# Patient Record
Sex: FEMALE | Race: BLACK OR AFRICAN AMERICAN | Employment: OTHER | URBAN - METROPOLITAN AREA
[De-identification: names, ages, dates, MRNs, and addresses within clinical notes are randomized per-mention and may not be internally consistent; named-entity substitution may affect disease eponyms.]

---

## 2018-08-21 ENCOUNTER — HOSPITAL ENCOUNTER (EMERGENCY)
Age: 68
Discharge: NURSING, REHAB OR LONG-TERM CARE | End: 2018-08-21
Attending: EMERGENCY MEDICINE
Payer: MEDICARE

## 2018-08-21 ENCOUNTER — APPOINTMENT (OUTPATIENT)
Dept: GENERAL RADIOLOGY | Age: 68
End: 2018-08-21
Attending: EMERGENCY MEDICINE
Payer: MEDICARE

## 2018-08-21 ENCOUNTER — APPOINTMENT (OUTPATIENT)
Dept: CT IMAGING | Age: 68
End: 2018-08-21
Attending: EMERGENCY MEDICINE
Payer: MEDICARE

## 2018-08-21 VITALS
OXYGEN SATURATION: 93 % | BODY MASS INDEX: 29.64 KG/M2 | TEMPERATURE: 97.7 F | RESPIRATION RATE: 15 BRPM | HEART RATE: 81 BPM | WEIGHT: 157 LBS | DIASTOLIC BLOOD PRESSURE: 84 MMHG | SYSTOLIC BLOOD PRESSURE: 140 MMHG | HEIGHT: 61 IN

## 2018-08-21 DIAGNOSIS — S00.03XA CONTUSION OF SCALP, INITIAL ENCOUNTER: Primary | ICD-10-CM

## 2018-08-21 LAB
AMORPH CRY URNS QL MICRO: ABNORMAL
ANION GAP SERPL CALC-SCNC: 8 MMOL/L (ref 5–15)
APPEARANCE UR: ABNORMAL
BACTERIA URNS QL MICRO: NEGATIVE /HPF
BASOPHILS # BLD: 0.1 K/UL (ref 0–0.1)
BASOPHILS NFR BLD: 1 % (ref 0–1)
BILIRUB UR QL: NEGATIVE
BUN SERPL-MCNC: 10 MG/DL (ref 6–20)
BUN/CREAT SERPL: 9 (ref 12–20)
CALCIUM SERPL-MCNC: 9 MG/DL (ref 8.5–10.1)
CHLORIDE SERPL-SCNC: 98 MMOL/L (ref 97–108)
CO2 SERPL-SCNC: 28 MMOL/L (ref 21–32)
COLOR UR: ABNORMAL
CREAT SERPL-MCNC: 1.07 MG/DL (ref 0.55–1.02)
DIFFERENTIAL METHOD BLD: ABNORMAL
EOSINOPHIL # BLD: 0.2 K/UL (ref 0–0.4)
EOSINOPHIL NFR BLD: 2 % (ref 0–7)
EPITH CASTS URNS QL MICRO: ABNORMAL /LPF
ERYTHROCYTE [DISTWIDTH] IN BLOOD BY AUTOMATED COUNT: 15.8 % (ref 11.5–14.5)
GLUCOSE SERPL-MCNC: 87 MG/DL (ref 65–100)
GLUCOSE UR STRIP.AUTO-MCNC: NEGATIVE MG/DL
HCT VFR BLD AUTO: 31.7 % (ref 35–47)
HGB BLD-MCNC: 9.9 G/DL (ref 11.5–16)
HGB UR QL STRIP: ABNORMAL
IMM GRANULOCYTES # BLD: 0.1 K/UL (ref 0–0.04)
IMM GRANULOCYTES NFR BLD AUTO: 1 % (ref 0–0.5)
KETONES UR QL STRIP.AUTO: NEGATIVE MG/DL
LEUKOCYTE ESTERASE UR QL STRIP.AUTO: ABNORMAL
LYMPHOCYTES # BLD: 0.8 K/UL (ref 0.8–3.5)
LYMPHOCYTES NFR BLD: 8 % (ref 12–49)
MCH RBC QN AUTO: 29.3 PG (ref 26–34)
MCHC RBC AUTO-ENTMCNC: 31.2 G/DL (ref 30–36.5)
MCV RBC AUTO: 93.8 FL (ref 80–99)
MONOCYTES # BLD: 0.7 K/UL (ref 0–1)
MONOCYTES NFR BLD: 7 % (ref 5–13)
NEUTS SEG # BLD: 8 K/UL (ref 1.8–8)
NEUTS SEG NFR BLD: 81 % (ref 32–75)
NITRITE UR QL STRIP.AUTO: NEGATIVE
NRBC # BLD: 0 K/UL (ref 0–0.01)
NRBC BLD-RTO: 0 PER 100 WBC
PH UR STRIP: 6 [PH] (ref 5–8)
PLATELET # BLD AUTO: 538 K/UL (ref 150–400)
PMV BLD AUTO: 8.5 FL (ref 8.9–12.9)
POTASSIUM SERPL-SCNC: 3.6 MMOL/L (ref 3.5–5.1)
PROT UR STRIP-MCNC: >300 MG/DL
RBC # BLD AUTO: 3.38 M/UL (ref 3.8–5.2)
RBC #/AREA URNS HPF: >100 /HPF (ref 0–5)
SODIUM SERPL-SCNC: 134 MMOL/L (ref 136–145)
SP GR UR REFRACTOMETRY: 1.02 (ref 1–1.03)
UA: UC IF INDICATED,UAUC: ABNORMAL
UROBILINOGEN UR QL STRIP.AUTO: 0.2 EU/DL (ref 0.2–1)
WBC # BLD AUTO: 9.8 K/UL (ref 3.6–11)
WBC URNS QL MICRO: ABNORMAL /HPF (ref 0–4)

## 2018-08-21 PROCEDURE — 87086 URINE CULTURE/COLONY COUNT: CPT | Performed by: EMERGENCY MEDICINE

## 2018-08-21 PROCEDURE — 85025 COMPLETE CBC W/AUTO DIFF WBC: CPT | Performed by: EMERGENCY MEDICINE

## 2018-08-21 PROCEDURE — 73502 X-RAY EXAM HIP UNI 2-3 VIEWS: CPT

## 2018-08-21 PROCEDURE — 81001 URINALYSIS AUTO W/SCOPE: CPT | Performed by: EMERGENCY MEDICINE

## 2018-08-21 PROCEDURE — 72125 CT NECK SPINE W/O DYE: CPT

## 2018-08-21 PROCEDURE — 99284 EMERGENCY DEPT VISIT MOD MDM: CPT

## 2018-08-21 PROCEDURE — 36415 COLL VENOUS BLD VENIPUNCTURE: CPT | Performed by: EMERGENCY MEDICINE

## 2018-08-21 PROCEDURE — 80048 BASIC METABOLIC PNL TOTAL CA: CPT | Performed by: EMERGENCY MEDICINE

## 2018-08-21 PROCEDURE — 70450 CT HEAD/BRAIN W/O DYE: CPT

## 2018-08-21 PROCEDURE — 77030005538 HC CATH URETH FOL44 BARD -B

## 2018-08-21 PROCEDURE — 87077 CULTURE AEROBIC IDENTIFY: CPT | Performed by: EMERGENCY MEDICINE

## 2018-08-21 PROCEDURE — 87186 SC STD MICRODIL/AGAR DIL: CPT | Performed by: EMERGENCY MEDICINE

## 2018-08-21 NOTE — ED NOTES
Stage II pressure wound on sacrum and anterior rt upper thigh. This wound is mostly healed, but there are a few shallow open areas. Pt was found to have stool, which was cleaned. Indwelling wyatt catheter was noted, placed 08/10. This will be changed today and urine sample collected.

## 2018-08-21 NOTE — ED NOTES
Pt reports falling out of bed between 0100 and 0300 this morning at [facility]. Pt reports headache and has small hematoma to right side of head. Pt reports chronic left hip pain. Pt in no acute distress at this time. VSS. Call bell within reach.

## 2018-08-21 NOTE — ED NOTES
Bedside shift change report given to Virginia Sánchez [facility] (oncoming nurse) by Francisco Javier Tipton RN (offgoing nurse). Report included the following information SBAR, Kardex, ED Summary, Intake/Output, MAR and Recent Results.

## 2018-08-21 NOTE — PROGRESS NOTES
CM consulted re: questions regarding stay at [facility] and [place]. CM met with pt and pt's son, introduced self, role. Pt and son verbalized understanding. Pt's son stated he would like the pt transferred to [facility] for rehab. CM offered education re: inpatient rehab vs. SNF care. Pt's son verbalized understanding. CM offered that if pt would like to switch SNF facilities to speak with admissions at [facility] for insurance authorization. Pt's son verbalized understanding and stated he has no further questions or concerns at this time. CM will continue to remain available for support, discharge planning as needed. Care Management Interventions  PCP Verified by CM:  Yes  Mode of Transport at Discharge: BLS  Transition of Care Consult (CM Consult): Discharge Planning  Discharge Durable Medical Equipment: No  Physical Therapy Consult: No  Occupational Therapy Consult: No  Current Support Network: Own Home  Confirm Follow Up Transport: Family  Plan discussed with Pt/Family/Caregiver: Yes  Discharge Location  Discharge Placement: Skilled nursing facility    LANI Nash  [facility] Manager  [phone]

## 2018-08-21 NOTE — DISCHARGE INSTRUCTIONS

## 2018-08-21 NOTE — ED NOTES
MD has reviewed discharge instructions with the patient. The patient verbalized understanding. Transportation arranged for patient to be discharged via [facility] to [address]

## 2018-08-21 NOTE — ED PROVIDER NOTES
EMERGENCY DEPARTMENT HISTORY AND PHYSICAL EXAM          Date: 8/21/2018  Patient Name: Beverly Newsome    History of Presenting Illness     Chief Complaint   Patient presents with    Fall     GLF between 0100 and 0300; pt reports hitting head; small hematoma to right side of head       History Provided By: Patient, Patient's Son and EMS    HPI: Beverly Newsome is a 69 y.o. female, pmhx HTN / UTI / urosepsis, who presents via EMS as transferred from [facility] to the ED for evaluation s/p GLF. She states she was attempting to get up when she slipped out of bed hitting the R side of her head on a nearby bedside table. Son notes the pt was recently admitted to [facility] for urosepsis and was subsequently placed at [facility]. Son further states the pt was later found to have multiple sacral fractures currently being followed. He notes the pt was acting \"loopy\" prior to her recent admission. Son states \"we were told the UTI was gone, but she's kind of acting loopy again. \" He notes the pt was found to have a secondary yeast infection, for which she is currently being treated with Diflucan. Pt additionally noted to take Oxycodone and Seroquel PRN. Pt denies any recent medications for her current sxs. She otherwise specifically denies any recent dizziness, blurred vision,  fevers, chills, nausea, vomiting, diarrhea, abd pain, CP, SOB, urinary sxs, changes in BM, or headache. PCP: Tonya Ramirez MD    PMHx: Significant for seizures, HTN, UTI, urosepsis, yeast infection  Social Hx: -tobacco, -EtOH, -Illicit Drugs    There are no other complaints, changes, or physical findings at this time.      Hematoma to the R side of her scalp  No montgomery sign  No racoon eyes  No mid-line cervical tenderness to palpation or step-off  abd soft without tenderness to palpation, no bruising  Full active and passive ROM of BLE and BL hips      Past History     Past Medical History:  No past medical history on file.    Past Surgical History:  No past surgical history on file. Family History:  No family history on file. Social History:  Social History   Substance Use Topics    Smoking status: Not on file    Smokeless tobacco: Not on file    Alcohol use Not on file       Allergies: Allergies   Allergen Reactions    Penicillins Swelling         Review of Systems   Review of Systems   Constitutional: Negative for chills and fever. HENT: Negative for congestion and sore throat. Eyes: Negative for visual disturbance. Respiratory: Negative for cough and shortness of breath. Cardiovascular: Negative for chest pain and leg swelling. Gastrointestinal: Negative for abdominal pain, blood in stool, diarrhea and nausea. Endocrine: Negative for polyuria. Genitourinary: Negative for dysuria, flank pain, vaginal bleeding and vaginal discharge. Musculoskeletal: Negative for myalgias. Skin: Positive for wound. Negative for rash. Allergic/Immunologic: Negative for immunocompromised state. Neurological: Negative for weakness and headaches. No LOC   Psychiatric/Behavioral: Positive for confusion. Physical Exam   Physical Exam   Constitutional: She is oriented to person, place, and time. She appears well-developed and well-nourished. HENT:   Head: Normocephalic and atraumatic. Mouth/Throat: Oropharynx is clear and moist.   Eyes: Conjunctivae and EOM are normal. Pupils are equal, round, and reactive to light. Neck: Normal range of motion. Neck supple. No tracheal deviation present. Cardiovascular: Normal rate, regular rhythm, normal heart sounds and intact distal pulses. No murmur heard. Pulmonary/Chest: Effort normal and breath sounds normal. No respiratory distress. She has no wheezes. She has no rales. Abdominal: Soft. Bowel sounds are normal. There is no tenderness. There is no rebound and no guarding. Musculoskeletal: She exhibits no edema or deformity.    Neurological: She is alert and oriented to person, place, and time. GCS eye subscore is 4. GCS verbal subscore is 5. GCS motor subscore is 6. EOMI intact, no facial droop or asymmetry, normal/equal sensation in face. Uvula elevates at midline, no tongue deviation. Normal strength with head rotation and shoulder shrug. 5/5 Strength in the bilateral upper and lower extremities, no pronator drift, normal finger to nose. No truncal ataxia. Normal speech: no dysarthria or aphasia. Skin: Skin is warm and dry. Psychiatric: She has a normal mood and affect. Her speech is normal.   Nursing note and vitals reviewed.         Diagnostic Study Results     Labs -     Recent Results (from the past 12 hour(s))   METABOLIC PANEL, BASIC    Collection Time: 08/21/18  7:16 AM   Result Value Ref Range    Sodium 134 (L) 136 - 145 mmol/L    Potassium 3.6 3.5 - 5.1 mmol/L    Chloride 98 97 - 108 mmol/L    CO2 28 21 - 32 mmol/L    Anion gap 8 5 - 15 mmol/L    Glucose 87 65 - 100 mg/dL    BUN 10 6 - 20 MG/DL    Creatinine 1.07 (H) 0.55 - 1.02 MG/DL    BUN/Creatinine ratio 9 (L) 12 - 20      GFR est AA >60 >60 ml/min/1.73m2    GFR est non-AA 51 (L) >60 ml/min/1.73m2    Calcium 9.0 8.5 - 10.1 MG/DL   URINALYSIS W/ REFLEX CULTURE    Collection Time: 08/21/18  8:12 AM   Result Value Ref Range    Color DARK YELLOW      Appearance CLOUDY (A) CLEAR      Specific gravity 1.025 1.003 - 1.030      pH (UA) 6.0 5.0 - 8.0      Protein >300 (A) NEG mg/dL    Glucose NEGATIVE  NEG mg/dL    Ketone NEGATIVE  NEG mg/dL    Bilirubin NEGATIVE  NEG      Blood LARGE (A) NEG      Urobilinogen 0.2 0.2 - 1.0 EU/dL    Nitrites NEGATIVE  NEG      Leukocyte Esterase MODERATE (A) NEG      WBC 20-50 0 - 4 /hpf    RBC >100 (H) 0 - 5 /hpf    Epithelial cells FEW FEW /lpf    Bacteria NEGATIVE  NEG /hpf    UA:UC IF INDICATED URINE CULTURE ORDERED (A) CNI      Amorphous Crystals 2+ (A) NEG   CBC WITH AUTOMATED DIFF    Collection Time: 08/21/18  8:59 AM   Result Value Ref Range    WBC 9.8 3.6 - 11.0 K/uL    RBC 3.38 (L) 3.80 - 5.20 M/uL    HGB 9.9 (L) 11.5 - 16.0 g/dL    HCT 31.7 (L) 35.0 - 47.0 %    MCV 93.8 80.0 - 99.0 FL    MCH 29.3 26.0 - 34.0 PG    MCHC 31.2 30.0 - 36.5 g/dL    RDW 15.8 (H) 11.5 - 14.5 %    PLATELET 288 (H) 558 - 400 K/uL    MPV 8.5 (L) 8.9 - 12.9 FL    NRBC 0.0 0  WBC    ABSOLUTE NRBC 0.00 0.00 - 0.01 K/uL    NEUTROPHILS 81 (H) 32 - 75 %    LYMPHOCYTES 8 (L) 12 - 49 %    MONOCYTES 7 5 - 13 %    EOSINOPHILS 2 0 - 7 %    BASOPHILS 1 0 - 1 %    IMMATURE GRANULOCYTES 1 (H) 0.0 - 0.5 %    ABS. NEUTROPHILS 8.0 1.8 - 8.0 K/UL    ABS. LYMPHOCYTES 0.8 0.8 - 3.5 K/UL    ABS. MONOCYTES 0.7 0.0 - 1.0 K/UL    ABS. EOSINOPHILS 0.2 0.0 - 0.4 K/UL    ABS. BASOPHILS 0.1 0.0 - 0.1 K/UL    ABS. IMM. GRANS. 0.1 (H) 0.00 - 0.04 K/UL    DF AUTOMATED         Radiologic Studies -     XR HIP LT W OR WO PELV 2-3 VWS              CT Results  (Last 48 hours)               08/21/18 0649  CT HEAD WO CONT Final result    Impression:  IMPRESSION:    There is no acute intracranial abnormality. Narrative:  EXAM:  CT head without contrast       INDICATION: Fall with head injury. COMPARISON: None. TECHNIQUE: Axial noncontrast head CT from foramen magnum to vertex. Coronal and   sagittal reformatted images were obtained. CT dose reduction was achieved   through use of a standardized protocol tailored for this examination and   automatic exposure control for dose modulation. FINDINGS:  There is diffuse age-related parenchymal volume loss. The ventricles   and sulci are age-appropriate without hydrocephalus. There is no mass effect or   midline shift. There is no intracranial hemorrhage or extra-axial fluid   collection. There is no significant white matter disease. The gray-white matter   differentiation is maintained. The basal cisterns are patent. The osseous structures are intact. A left frontal sinus osteoma is noted.  The   visualized paranasal sinuses and mastoid air cells are otherwise clear. 08/21/18 0649  CT SPINE CERV WO CONT Final result    Impression:  IMPRESSION:    No acute abnormality. Cervical degenerative changes with grade 1 anterolisthesis   at C3-4 and C4-5. There is left neural foraminal stenosis at C3-4. Narrative:  EXAM:  CT C-spine without contrast       INDICATION: Fall with head injury. COMPARISON: None. TECHNIQUE: Thin section axial noncontrast CT of the cervical spine with coronal   and sagittal reformats. CT dose reduction was achieved through use of a   standardized protocol tailored for this examination and automatic exposure   control for dose modulation. FINDINGS: There is no acute fracture or subluxation. Vertebral body heights are   maintained. There is 2 to 3 mm of anterolisthesis at C3-4 and C4-5. There is   mild intervertebral disc space narrowing at C4-5 and C6-7. There is no spinal   canal stenosis. There is left neural foraminal stenosis at C3-4. The paraspinal   soft tissues are unremarkable. Medical Decision Making   I am the first provider for this patient. I reviewed the vital signs, available nursing notes, past medical history, past surgical history, family history and social history. Vital Signs-Reviewed the patient's vital signs.   Patient Vitals for the past 12 hrs:   Temp Pulse Resp BP SpO2   08/21/18 0913 - - - - 96 %   08/21/18 0800 - - - 151/73 -   08/21/18 0700 - - - - 97 %   08/21/18 0630 - - - - 99 %   08/21/18 0615 - - - 130/58 99 %   08/21/18 0600 - - - 125/71 -   08/21/18 0545 - - - 118/89 95 %   08/21/18 0536 98.4 °F (36.9 °C) 94 16 135/74 100 %       Pulse Oximetry Analysis - 100% on RA    Cardiac Monitor:   Rate: 92 bpm  Rhythm: Normal Sinus Rhythm     Records Reviewed: Nursing Notes, Old Medical Records and Previous electrocardiograms    Provider Notes (Medical Decision Making):     DDx: sprain, strain, fracture, contusion, recurrent UTI, electrolyte disturbance    ED Course:   Initial assessment performed. The patients presenting problems have been discussed, and they are in agreement with the care plan formulated and outlined with them. I have encouraged them to ask questions as they arise throughout their visit. SIGN OUT:  7:00 AM  Patient's presentation, labs/imaging and plan of care was reviewed with Rodney Esquivel MD as part of sign out. They will await pending lab and imaging studies as part of the plan discussed with the patient. Rodney Esquivel MD's assistance in completion of this plan is greatly appreciated but it should be noted that I will be the provider of record for this patient. [employer], DO    8:04 AM  Nursing reports helping pt undress in order to obtain urine. She reports pt covered in stool; pt cleaned. Nursing notes multiple stage II pressure ulcers across pt's sacrum and groin. She states pt is unsure why she has indwelling wyatt catheter. Diagnosis     Clinical Impression:   1. Contusion of scalp, initial encounter        PLAN:  1. There are no discharge medications for this patient. 2.   Follow-up Information     Follow up With Details Comments 1282 Ryanne Hernandez MD In 2 days As needed, If symptoms worsen [address]  [address] [facility]  [phone]          Return to ED if worse     Disposition:    DISCHARGE NOTE:  10:19 AM  The patient's results have been reviewed with family and/or caregiver. They verbally convey their understanding and agreement of the patient's signs, symptoms, diagnosis, treatment, and prognosis. They additionally agree to follow up as recommended in the discharge instructions or to return to the Emergency Room should the patient's condition change prior to their follow-up appointment. The family and/or caregiver verbally agrees with the care-plan and all of their questions have been answered.  The discharge instructions have also been provided to the them along with educational information regarding the patient's diagnosis and a list of reasons why the patient would want to return to the ER prior to their follow-up appointment should their condition change. Attestations: This note is prepared by Kassidy Patel, acting as DO Gasper Mejia DO  : The scribe's documentation has been prepared under my direction and personally reviewed by me in its entirety. I confirm that the note above accurately reflects all work, treatment, procedures, and medical decision making performed by me. This note will not be viewable in [address]

## 2018-08-23 LAB
BACTERIA SPEC CULT: ABNORMAL
CC UR VC: ABNORMAL
SERVICE CMNT-IMP: ABNORMAL

## 2018-08-23 NOTE — PROGRESS NOTES
[place] [address] where patient is currently located. Pt was discharged back to their facility after her recent ED visit without antibiotics. Faxed a copy of her urine culture to her nurse Gissel Sullivan for review by her attending so the patient may be treated.

## 2018-09-13 ENCOUNTER — HOSPITAL ENCOUNTER (OUTPATIENT)
Dept: MRI IMAGING | Age: 68
Discharge: HOME | End: 2018-09-13
Attending: ORTHOPAEDIC SURGERY
Payer: MEDICARE

## 2018-09-13 DIAGNOSIS — M47.816 OSTEOARTHRITIS OF LUMBAR SPINE: ICD-10-CM

## 2018-09-13 PROCEDURE — 72148 MRI LUMBAR SPINE W/O DYE: CPT

## 2018-09-25 ENCOUNTER — OFFICE VISIT (OUTPATIENT)
Dept: NEUROLOGY | Age: 68
End: 2018-09-25

## 2018-09-25 VITALS
HEART RATE: 84 BPM | HEIGHT: 61 IN | BODY MASS INDEX: 28.89 KG/M2 | RESPIRATION RATE: 16 BRPM | SYSTOLIC BLOOD PRESSURE: 130 MMHG | OXYGEN SATURATION: 95 % | WEIGHT: 153 LBS | DIASTOLIC BLOOD PRESSURE: 80 MMHG

## 2018-09-25 DIAGNOSIS — R27.0 ATAXIA: ICD-10-CM

## 2018-09-25 DIAGNOSIS — M48.062 SPINAL STENOSIS OF LUMBAR REGION WITH NEUROGENIC CLAUDICATION: Primary | ICD-10-CM

## 2018-09-25 DIAGNOSIS — R41.3 MEMORY LOSS: ICD-10-CM

## 2018-09-25 RX ORDER — LEVETIRACETAM 500 MG/1
TABLET ORAL DAILY
COMMUNITY

## 2018-09-25 NOTE — LETTER
9/25/2018 11:52 AM 
 
Patient:  Ade Casper YOB: 1950 Date of Visit: 9/25/2018 Dear Pola Sharma MD 
[address] [facility] [address] [place] [id number] VIA In Basket Isaías King MD 
[address] [place] [phone] VIA In Basket 
 : Thank you for referring Ms. Di Quezada to me for evaluation/treatment. Below are the relevant portions of my assessment and plan of care. If you have questions, please do not hesitate to call me. I look forward to following Ms. Jerrica Mcgraw along with you. Sincerely, Brooke Leonardo MD

## 2018-09-25 NOTE — PROGRESS NOTES
Patient is here for bilateral weakness in legs Patient unsure of medications, did not bring list

## 2018-09-25 NOTE — PROGRESS NOTES
Chief Complaint Patient presents with  Neurologic Problem HISTORY OF PRESENT ILLNESS Laina Jasmine is a 69 y.o. female who came in for an evaluation requested by Dr. Alexander Dacosta. She came along with her son. She is not a great historian and son tells me that she has been having some memory issues. She was fairly independent with her walking ability about 2-3 months ago. Then she started having pain mainly in her hips and saw orthopedics and was diagnosed with possible trochanteric bursitis and received an injection which apparently did not help. She was doing therapy and was starting to do better but then continued to decline along with worsening pain in her lower back going down into her hips and thighs. She also developed bladder control issues and has been having urinary retention. Had an MRI of the lumbar spine which showed severe multilevel disc and facet degenerative change. Clinically she has progressed to the point that she can barely able to stand up or walk. Has been using a wheelchair. Due to this progressive decline, and underlying neurological process was considered and she came in for an evaluation. With her knees she is currently living and has been needing help with activities of daily living. Family is concerned about her overall cognitive status which has also been a change over the past few months. She tends to forget things. Past Medical History:  
Diagnosis Date  Hypertension Current Outpatient Prescriptions Medication Sig  levETIRAcetam (KEPPRA) 500 mg tablet Take  by mouth daily. No current facility-administered medications for this visit. Allergies Allergen Reactions  Penicillins Swelling History reviewed. No pertinent family history. Social History Substance Use Topics  Smoking status: Former Smoker  Smokeless tobacco: Never Used  Alcohol use None History reviewed. No pertinent surgical history. REVIEW OF SYSTEMS Review of Systems - History obtained from the patient Psychological ROS: negative ENT ROS: negative Hematological and Lymphatic ROS: negative Endocrine ROS: negative Respiratory ROS: no cough, shortness of breath, or wheezing Cardiovascular ROS: no chest pain or dyspnea on exertion Gastrointestinal ROS: no abdominal pain, change in bowel habits, or black or bloody stools Genito-Urinary ROS: positive for -urinary retention Musculoskeletal ROS: negative 
positive for - pain in back - bilateral 
Dermatological ROS: negative PHYSICAL EXAMINATION:   
Visit Vitals  /80  Pulse 84  Resp 16  
 Ht 5' 1\" (1.549 m)  Wt 69.4 kg (153 lb)  SpO2 95%  BMI 28.91 kg/m2 General:  Well  nourished, and groomed individual in no acute distress. Neck: Supple, nontender, thyroid within normal limits, no pain with resistance to active range of motion. Heart: Regular rate and rhythm, no murmurs, rub, or gallop. Normal S1S2. Lungs:  Clear to auscultation bilaterally with equal chest expansion, no cough, no wheeze Musculoskeletal:  Extremities revealed no edema. Psych:  Good mood and bright affect NEUROLOGICAL EXAMINATION:    
Mental Status:   Alert and oriented to person, place, and time. She is able to answer simple questions correctly. Had difficulty with serial 7 subtractions and could not spell the word world backwards. Attention span and concentration are normal. Speech is fluent. Cranial Nerves:   
II, III, IV, VI:  Visual acuity grossly intact. Visual fields are normal.   
Pupils are equal, round, and reactive to light and accommodation. Extra-ocular movements are full and fluid. Normal muscle tone. Strength 5/5 in both upper extremities. V-XII: Hearing is grossly intact. Facial features are symmetric, with normal sensation and strength. The palate rises symmetrically and the tongue protrudes midline. Sternocleidomastoids 5/5. Motor Examination:  Normal tone. Strength 5/5 in both upper extremities. Right lower extremity strength is 4/5 proximally and distally. Left lower extremity strength 4/5 proximally and 3/5 at ankle dorsiflexion and plantarflexion. No cogwheel rigidity or clonus present. Sensory exam: Intact pinprick, temperature, and vibration sense. Coordination:  Finger to nose and rapid arm movement testing was normal.   No resting or intention tremor Gait and Station: Unable to stand without assistance. Had difficulty putting weight on her legs and severe truncal ataxia. No muscle wasting or fasiculations noted. Reflexes:  DTRs brisk in the upper extremities, 2+ at the knee and 1+ at the ankles. Toe upgoing on the right. LABS / IMAGING 
MRI Results (most recent): 
 
Results from Hospital Encounter encounter on 09/13/18 MRI LUMB SPINE WO CONT Narrative EXAM:  MRI LUMB SPINE WO CONT History: Lumbar myelopathy INDICATION:  Osteoarthritis of lumbar spine. COMPARISON: None TECHNIQUE: MR imaging of the lumbar spine was performed using the following 
sequences: sagittal T1, T2, STIR;  axial T1, T2.  
 
CONTRAST:  None. FINDINGS: 
 
Congenital narrowing of the lumbar canal. Discogenic degenerative change 
greatest at L1-L2. Multilevel disc desiccation loss of disc height. Abdominal 
aorta normal in caliber. Proximal common iliac vessels normal in caliber. Redundancy of the cauda equina Vertebral body heights are maintained. No focal 
marrow lesion. The conus medullaris terminates at L1-L2. . Signal and caliber of the distal 
spinal cord are within normal limits. The paraspinal soft tissues are within normal limits. Lower thoracic spine: Foraminal stenosis T11-T12 and T12-L1 mild to moderate. L1-L2:  Facet arthropathy and hypertrophy is severe on the left. Moderate 
broad-based disc protrusion extensors left neural foramen. The canal is patent. Severe left foraminal stenosis. Moderate right foraminal stenosis. L2-L3:  Arthropathy and hypertrophy is severe. Ligament flavum hypertrophy. Disc 
bulge. Mild canal stenosis. Severe bilateral foraminal stenoses. L3-L4:  Moderate to severe facet arthropathy. Disc bulge/osteophyte extensors 
the foramina. Canal is patent. Moderate bilateral foraminal stenoses. L4-L5:  Facet arthropathy and hypertrophy is severe on the right. Moderate 
broad-based protrusion extends towards the right more so than the left foramen. Moderate canal and severe bilateral foraminal stenosis. L5-S1:  Facet arthropathy and hypertrophy is severe on the right. Right 
foraminal protrusion/osteophyte. Severe right foraminal stenosis. Severe left 
foraminal stenosis. Effacement of nerve roots extending to both the right and 
left S1 foramina. Colonic diverticula. S-shaped scoliosis dextroscoliosis upper lumbar 
levoscoliosis lower lumbar region. There is a sacral insufficiency fracture on 
the left. Marrow edema in the left sacrum. Impression IMPRESSION: 
Acute sacral sufficiency fracture on the left. Severe multilevel disc and facet degenerative change, superimposed on congenital 
narrowing of the lumbar canal. 
 
Moderate canal and severe bilateral foraminal stenoses at L4-L5. Multilevel severe foraminal stenoses as described above most pronounced at L5-S1. 23X   
 
 
 
ASSESSMENT 
  ICD-10-CM ICD-9-CM 1. Spinal stenosis of lumbar region with neurogenic claudication M48.062 724.03 MRI CERV SPINE WO CONT  
   EMG NCV MOTOR WO F/WAVE PER NERVE 2. Ataxia R27.0 781.3 MRI BRAIN WO CONT  
   MRI CERV SPINE WO CONT  
   EMG NCV MOTOR WO F/WAVE PER NERVE 3. Memory loss R41.3 780.93 MRI BRAIN WO CONT  
 
 
DISCUSSION Ms. Terra Snow has had rapidly declining ability to walk associated with pain in the lower back radiating down into the hips.   Her MRI of the lumbar spine does show advanced degenerative disc and joint disease but it does not clearly explain the quick decline. I agree that she needs additional central and peripheral neurological workup. Given associated memory and bladder control issues, brain imaging is warranted to rule out conditions such as normal pressure hydrocephalus or other structural cause. MRI of the cervical spine to rule out coexisting cervical spine disease/myelopathy EMG/NCV to evaluate regarding multilevel radiculopathies Thank you for allowing me to participate in the care of Ms. Tapan Story. Please feel free to contact me if you have any questions. I will be happy to follow to follow her along with you. Gabby Mendiola MD 
Diplomate, American Board of Psychiatry & Neurology (Neurology) [place] Devi Board of Psychiatry & Neurology (Clinical Neurophysiology) Diplomate, [address] [place] [address] Board of Electrodiagnostic Medicine

## 2018-09-25 NOTE — MR AVS SNAPSHOT
[facility] [address] 
[phone] Patient: Andra Mohs MRN: [id number] :1950 Visit Information Date & Time Provider Department Dept. Phone Encounter #  
 2018 11:00 AM Gabby Mendiola MD [facility] at [address] [phone] Follow-up Instructions Return for EMG. Upcoming Health Maintenance Date Due Hepatitis C Screening 1950 DTaP/Tdap/Td series (1 - Tdap) 10/10/1969 Shingrix Vaccine Age 50> (1 of 2) 10/10/1998 BREAST CANCER SCRN MAMMOGRAM 10/10/1998 FOBT Q 1 YEAR AGE 50-75 10/10/1998 GLAUCOMA SCREENING Q2Y 10/10/2013 Bone Densitometry (Dexa) Screening 10/10/2013 Pneumococcal 65+ Low/Medium Risk (1 of 2 - PCV13) 10/10/2013 Influenza Age 5 to Adult 2018 MEDICARE YEARLY EXAM 2018 Allergies as of 2018  Review Complete On: 2018 By: Gabby Mendiola MD  
  
 Severity Noted Reaction Type Reactions Penicillins  2018    Swelling Current Immunizations  Never Reviewed No immunizations on file. Not reviewed this visit You Were Diagnosed With   
  
 Codes Comments Spinal stenosis of lumbar region with neurogenic claudication    -  Primary ICD-10-CM: L48.471 
ICD-9-CM: 724.03 Ataxia     ICD-10-CM: R27.0 ICD-9-CM: 367. 3 Memory loss     ICD-10-CM: R41.3 ICD-9-CM: 780.93 Vitals BP Pulse Resp Height(growth percentile) Weight(growth percentile) SpO2  
 130/80 84 16 5' 1\" (1.549 m) 153 lb (69.4 kg) 95% BMI OB Status Smoking Status 28.91 kg/m2 Postmenopausal Former Smoker Vitals History BMI and BSA Data Body Mass Index Body Surface Area  
 28.91 kg/m 2 1.73 m 2 Preferred Pharmacy Pharmacy Name Phone [facility] [phone] [facility], [address] [facility] [phone] Your Updated Medication List  
  
   
This list is accurate as of 9/25/18 11:36 AM.  Always use your most recent med list.  
  
  
  
  
 levETIRAcetam 500 mg tablet Commonly known as:  KEPPRA Take  by mouth daily. Follow-up Instructions Return for EMG. To-Do List   
 09/26/2018 Neurology:  EMG NCV MOTOR WO F/WAVE PER NERVE   
  
 09/26/2018 Imaging:  MRI BRAIN WO CONT   
  
 09/26/2018 Imaging:  MRI CERV SPINE WO CONT Introducing [facility]! [facility] introduces Kelan patient portal. Now you can access parts of your medical record, email your doctor's office, and request medication refills online. 1. In your internet browser, go to [email or web address] ([email or web address] 2. Click on the First Time User? Click Here link in the Sign In box. You will see the New Member Sign Up page. 3. Enter your Kelan Access Code exactly as it appears below. You will not need to use this code after youve completed the sign-up process. If you do not sign up before the expiration date, you must request a new code. · Kelan Access Code: [email or web address] Expires: 11/19/2018  5:37 AM 
 
4. Enter the last four digits of your Social Security Number (xxxx) and Date of Birth (mm/dd/yyyy) as indicated and click Submit. You will be taken to the next sign-up page. 5. Create a Kelan ID. This will be your Kelan login ID and cannot be changed, so think of one that is secure and easy to remember. 6. Create a Kelan password. You can change your password at any time. 7. Enter your Password Reset Question and Answer. This can be used at a later time if you forget your password. 8. Enter your e-mail address. You will receive e-mail notification when new information is available in [address] 9. Click Sign Up. You can now view and download portions of your medical record. 10. Click the Download Summary menu link to download a portable copy of your medical information. If you have questions, please visit the Frequently Asked Questions section of the Visst website. Remember, Watchup is NOT to be used for urgent needs. For medical emergencies, dial 911. Now available from your iPhone and Android! Please provide this summary of care documentation to your next provider. Your primary care clinician is listed as Smaantha Barber. If you have any questions after today's visit, please call [phone].

## 2025-04-25 ENCOUNTER — ANESTHESIA EVENT (OUTPATIENT)
Facility: HOSPITAL | Age: 75
End: 2025-04-25
Payer: MEDICARE

## 2025-04-25 ENCOUNTER — ANESTHESIA (OUTPATIENT)
Facility: HOSPITAL | Age: 75
End: 2025-04-25
Payer: MEDICARE

## 2025-04-25 ENCOUNTER — HOSPITAL ENCOUNTER (OUTPATIENT)
Facility: HOSPITAL | Age: 75
Setting detail: OUTPATIENT SURGERY
Discharge: HOME | End: 2025-04-25
Attending: INTERNAL MEDICINE | Admitting: INTERNAL MEDICINE
Payer: MEDICARE

## 2025-04-25 VITALS
HEART RATE: 76 BPM | HEIGHT: 60 IN | DIASTOLIC BLOOD PRESSURE: 53 MMHG | BODY MASS INDEX: 21.79 KG/M2 | SYSTOLIC BLOOD PRESSURE: 197 MMHG | RESPIRATION RATE: 17 BRPM | TEMPERATURE: 98.2 F | OXYGEN SATURATION: 94 % | WEIGHT: 111 LBS

## 2025-04-25 PROCEDURE — 2720000010 HC SURG SUPPLY STERILE: Performed by: INTERNAL MEDICINE

## 2025-04-25 PROCEDURE — 3600007502: Performed by: INTERNAL MEDICINE

## 2025-04-25 PROCEDURE — 7100000010 HC PHASE II RECOVERY - FIRST 15 MIN: Performed by: INTERNAL MEDICINE

## 2025-04-25 PROCEDURE — 3700000001 HC ADD 15 MINUTES (ANESTHESIA): Performed by: INTERNAL MEDICINE

## 2025-04-25 PROCEDURE — 88305 TISSUE EXAM BY PATHOLOGIST: CPT

## 2025-04-25 PROCEDURE — 7100000011 HC PHASE II RECOVERY - ADDTL 15 MIN: Performed by: INTERNAL MEDICINE

## 2025-04-25 PROCEDURE — 2580000003 HC RX 258

## 2025-04-25 PROCEDURE — 3600007512: Performed by: INTERNAL MEDICINE

## 2025-04-25 PROCEDURE — 2709999900 HC NON-CHARGEABLE SUPPLY: Performed by: INTERNAL MEDICINE

## 2025-04-25 PROCEDURE — 3700000000 HC ANESTHESIA ATTENDED CARE: Performed by: INTERNAL MEDICINE

## 2025-04-25 PROCEDURE — 6360000002 HC RX W HCPCS

## 2025-04-25 RX ORDER — LOSARTAN POTASSIUM AND HYDROCHLOROTHIAZIDE 25; 100 MG/1; MG/1
TABLET ORAL
COMMUNITY
Start: 2024-11-15

## 2025-04-25 RX ORDER — SODIUM CHLORIDE, SODIUM LACTATE, POTASSIUM CHLORIDE, CALCIUM CHLORIDE 600; 310; 30; 20 MG/100ML; MG/100ML; MG/100ML; MG/100ML
INJECTION, SOLUTION INTRAVENOUS
Status: DISCONTINUED | OUTPATIENT
Start: 2025-04-25 | End: 2025-04-25 | Stop reason: SDUPTHER

## 2025-04-25 RX ORDER — SODIUM CHLORIDE 0.9 % (FLUSH) 0.9 %
5-40 SYRINGE (ML) INJECTION PRN
Status: DISCONTINUED | OUTPATIENT
Start: 2025-04-25 | End: 2025-04-25 | Stop reason: HOSPADM

## 2025-04-25 RX ORDER — LIDOCAINE HYDROCHLORIDE 20 MG/ML
INJECTION, SOLUTION EPIDURAL; INFILTRATION; INTRACAUDAL; PERINEURAL
Status: DISCONTINUED | OUTPATIENT
Start: 2025-04-25 | End: 2025-04-25 | Stop reason: SDUPTHER

## 2025-04-25 RX ORDER — SODIUM CHLORIDE 9 MG/ML
INJECTION, SOLUTION INTRAVENOUS PRN
Status: DISCONTINUED | OUTPATIENT
Start: 2025-04-25 | End: 2025-04-25 | Stop reason: HOSPADM

## 2025-04-25 RX ORDER — ALENDRONATE SODIUM 70 MG/1
70 TABLET ORAL
COMMUNITY
Start: 2024-11-15

## 2025-04-25 RX ORDER — SODIUM CHLORIDE 0.9 % (FLUSH) 0.9 %
5-40 SYRINGE (ML) INJECTION EVERY 12 HOURS SCHEDULED
Status: DISCONTINUED | OUTPATIENT
Start: 2025-04-25 | End: 2025-04-25 | Stop reason: HOSPADM

## 2025-04-25 RX ORDER — LAMOTRIGINE 200 MG/1
200 TABLET ORAL
COMMUNITY
Start: 2024-11-15

## 2025-04-25 RX ORDER — CLONIDINE HYDROCHLORIDE 0.1 MG/1
0.1 TABLET ORAL
COMMUNITY
Start: 2024-11-15

## 2025-04-25 RX ORDER — LOSARTAN POTASSIUM 25 MG/1
25 TABLET ORAL DAILY
COMMUNITY

## 2025-04-25 RX ORDER — LEVETIRACETAM 750 MG/1
750 TABLET ORAL
COMMUNITY
Start: 2024-11-15

## 2025-04-25 RX ADMIN — PROPOFOL 20 MG: 10 INJECTION, EMULSION INTRAVENOUS at 12:09

## 2025-04-25 RX ADMIN — PROPOFOL 300 MG: 10 INJECTION, EMULSION INTRAVENOUS at 12:56

## 2025-04-25 RX ADMIN — PROPOFOL 20 MG: 10 INJECTION, EMULSION INTRAVENOUS at 12:03

## 2025-04-25 RX ADMIN — SODIUM CHLORIDE, POTASSIUM CHLORIDE, SODIUM LACTATE AND CALCIUM CHLORIDE: 600; 310; 30; 20 INJECTION, SOLUTION INTRAVENOUS at 11:41

## 2025-04-25 RX ADMIN — PROPOFOL 20 MG: 10 INJECTION, EMULSION INTRAVENOUS at 12:11

## 2025-04-25 RX ADMIN — LIDOCAINE HYDROCHLORIDE 50 MG: 20 INJECTION, SOLUTION EPIDURAL; INFILTRATION; INTRACAUDAL; PERINEURAL at 11:59

## 2025-04-25 RX ADMIN — PROPOFOL 20 MG: 10 INJECTION, EMULSION INTRAVENOUS at 12:06

## 2025-04-25 RX ADMIN — PROPOFOL 100 MG: 10 INJECTION, EMULSION INTRAVENOUS at 11:59

## 2025-04-25 RX ADMIN — PROPOFOL 20 MG: 10 INJECTION, EMULSION INTRAVENOUS at 12:13

## 2025-04-25 ASSESSMENT — LIFESTYLE VARIABLES: SMOKING_STATUS: 1

## 2025-04-25 ASSESSMENT — PAIN - FUNCTIONAL ASSESSMENT: PAIN_FUNCTIONAL_ASSESSMENT: NONE - DENIES PAIN

## 2025-04-25 ASSESSMENT — PAIN SCALES - GENERAL
PAINLEVEL_OUTOF10: 0
PAINLEVEL_OUTOF10: 0

## 2025-04-25 NOTE — DISCHARGE INSTRUCTIONS
Janeth Watts  [id number]  1950    It was my pleasure seeing you for your procedure.  You will also receive a summary report with the findings from this procedure and any further recommendations.  If you had polyps removed or biopsies taken during your procedure, you will receive a separate letter from me within the next 2 weeks.  If you don't receive this letter or if you have any questions, please call my office [phone].     Please take note of the post procedure instructions listed below.    Best Wishes,    Dr. Vines      CARE FOLLOWING YOUR PROCEDURE    These instructions give you information on caring for yourself after your procedure. Call your doctor if you have any problems or questions after your procedure.    HOME CARE  Walk if you have belly cramping or gas.  Walking will help get rid of the air and reduce the bloated feeling in your belly (abdomen).  Your IV site (where you received drugs) may be tender to touch.  Place warm towels on the site; keep your arm up on two pillows if you have any swelling or soreness in the area.  You may shower.    ACTIVITY:  Take frequent rest periods and move at a slower pace for the next 24 hours..  You may resume your regular activity tomorrow if you are feeling back to normal.  Do not drive or ride a bicycle for at least 24 hours (because of the medicine (anesthesia) used during the test).  Do not sign any important legal documents or use or operate any machinery for 24 hours  Do not take sleeping medicines/nerve drugs for 24 hours unless the doctor tells you.  You can return to work/school tomorrow unless otherwise instructed.    NUTRITION:  Drink plenty of fluids to keep your pee (urine) clear or pale yellow  Begin with a light meal and progress to your normal diet. Heavy or fried foods are harder to digest and may make you feel sick to your stomach (nauseated).  Once you are feeling back to normal, you may resume your normal

## 2025-04-25 NOTE — H&P
[facility]  Outpatient History and Physical    Patient: Janeth Watts    Physician: Nixon Vines MD    Vital Signs: Blood pressure (!) 163/64, pulse 78, resp. rate 20, height 1.524 m (5'), weight 50.3 kg (111 lb), SpO2 97%.    Allergies:   Allergies   Allergen Reactions    Penicillins Swelling       Chief Complaint:   Egd / colonoscopy to assess:  Diarrhea  Abnormal weight loss  Change in bowel habit  Smoker  Personal history of colonic polyps   Severe unintentional weight loss 20+lbs    History:  Past Medical History:   Diagnosis Date    Hyperlipidemia     Hypertension     Seizure disorder (HCC) 2024    Petit mal seizures      Past Surgical History:   Procedure Laterality Date    APPENDECTOMY      CHOLECYSTECTOMY      COLONOSCOPY        Social History     Socioeconomic History    Marital status:      Spouse name: None    Number of children: None    Years of education: None    Highest education level: None   Tobacco Use    Smoking status: Every Day     Current packs/day: 0.25     Average packs/day: 0.3 packs/day for 55.3 years (13.8 ttl pk-yrs)     Types: Cigarettes     Start date: 1970    Smokeless tobacco: Never   Substance and Sexual Activity    Alcohol use: Never    Drug use: Never     Social Drivers of Health     Financial Resource Strain: Low Risk  (4/10/2025)    Received from [facility]    Overall Financial Resource Strain (CARDIA)     Difficulty of Paying Living Expenses: Not hard at all   Food Insecurity: No Food Insecurity (4/10/2025)    Received from [facility]    Hunger Vital Sign     Worried About Running Out of Food in the Last Year: Never true     Ran Out of Food in the Last Year: Never true   Transportation Needs: No Transportation Needs (4/10/2025)    Received from [facility]    PRAPARE - Transportation     Lack of Transportation (Medical): No     Lack of Transportation (Non-Medical): No   Physical Activity: Inactive (4/10/2025)    Received from [facility]

## 2025-04-25 NOTE — ANESTHESIA PRE PROCEDURE
Department of Anesthesiology  Preprocedure Note       Name:  Janeth Watts   Age:  74 y.o.  :  1950                                          MRN:  [id number]         Date:  2025      Surgeon: Surgeon(s):  Nixon Vines MD    Procedure: Procedure(s):  ESOPHAGOGASTRODUODENOSCOPY  COLONOSCOPY DIAGNOSTIC    Medications prior to admission:   Prior to Admission medications    Medication Sig Start Date End Date Taking? Authorizing Provider   levETIRAcetam (KEPPRA) 750 MG tablet Take 1 tablet by mouth 11/15/24  Yes Marilu Cartagena MD   losartan (COZAAR) 25 MG tablet Take 1 tablet by mouth daily   Yes ProviderMarilu MD   losartan-hydroCHLOROthiazide (HYZAAR) 100-25 MG per tablet Take by mouth 11/15/24  Yes ProviderMarilu MD   Multiple Vitamins-Minerals (CENTRUM WOMEN) TABS Take by mouth   Yes ProviderMarilu MD   lamoTRIgine (LAMICTAL) 200 MG tablet Take 1 tablet by mouth 11/15/24  Yes Marilu Cartagena MD   cloNIDine (CATAPRES) 0.1 MG tablet Take 1 tablet by mouth 11/15/24  Yes ProviderMarilu MD   alendronate (FOSAMAX) 70 MG tablet Take 1 tablet by mouth 11/15/24  Yes Provider, MD Marilu       Current medications:    No current facility-administered medications for this encounter.       Allergies:    Allergies   Allergen Reactions   • Penicillins Swelling       Problem List:  There is no problem list on file for this patient.      Past Medical History:        Diagnosis Date   • Hyperlipidemia    • Hypertension    • Seizure disorder (HCC)     Petit mal seizures       Past Surgical History:        Procedure Laterality Date   • APPENDECTOMY     • CHOLECYSTECTOMY     • COLONOSCOPY         Social History:    Social History     Tobacco Use   • Smoking status: Every Day     Current packs/day: 0.25     Average packs/day: 0.3 packs/day for 55.3 years (13.8 ttl pk-yrs)     Types: Cigarettes     Start date:    • Smokeless tobacco: Never   Substance Use Topics

## 2025-04-25 NOTE — OP NOTE
Colonoscopy and EGD Procedure Note      Indications:  Diarrhea  Abnormal weight loss  Change in bowel habit  Smoker  Personal history of colonic polyps   Severe unintentional weight loss 20+lbs     :  Nixon Vines MD    Staff: Circulator: Katelynn Farfan RN; Tammie Sparrow RN    Referring Provider: No, Pcp    Sedation:  MAC anesthesia Propofol    Procedure Details:  After informed consent was obtained with all risks and benefits of procedure explained and pre-operative exam completed, pt was placed in the left lateral decubitus position. Following sequential administration of sedation as per above, the gastroscope was inserted into the mouth and advanced under direct vision to second portion of the duodenum.  A careful inspection was made as the gastroscope was withdrawn, including a retroflexed view of the proximal stomach; findings and interventions are described below.      EGD Findings:  Esophagus: normal esophageal mucosa. EGJ at 40cm and regular with intact GEFV on retroflexion without mass  Stomach: patent pylorus, otherwise normal stomach mucosa, biopsied cold forceps with minimal bleeding antrum, incisura, body, cardia, to r/o H pylori.  Duodenum/jejunum: normal mucosa and vascular pattern, biopsied cold forceps to r/o celiac    The bed was then turned and upon sequential sedation as per above, a digital rectal exam was performed per below. The Olympus videocolonoscope was inserted in the rectum and carefully advanced to the cecum, which was identified by the ileocecal valve and appendiceal orifice.  The quality of preparation was  excellent BPS 9 .  The colonoscope was slowly withdrawn with careful evaluation between folds. Retroflexion in the rectum was performed.     Colon Findings:   CHRISTOS: very weak rectal tone  Rectum:   - unable to hold air and unable to retroflex. No mass appreciated on forward view  Sigmoid: two sessile polyps ranging 4-6mm removed with cold

## 2025-04-25 NOTE — PROGRESS NOTES
Endoscopy Case End Note:    1302:  Procedure scope was pre-cleaned, per protocol, at bedside by DARCY Farfan.      1302:  Report received from anesthesia - ALEXIA Cueto.  See anesthesia flowsheet for intra-procedure vital signs and events.

## 2025-04-25 NOTE — PERIOP NOTE
ARRIVAL INFORMATION:  Verified patient name and date of birth, scheduled procedure, and informed consent.     : Charlie (son) contact number: [phone]  Physician and staff can share information with the .     Receive texts: yes    Belongings with patient include:  Clothing, wheelchair    GI FOCUSED ASSESSMENT:  Neuro: Awake, alert, oriented x4  Respiratory: even and unlabored   GI: soft and non-distended  EKG Rhythm: RBBB    Education: The risks and benefits of the bite block have been explained to patient.  Patient verbalizes understanding.  Reviewed general discharge instructions and  information.

## 2025-04-25 NOTE — ANESTHESIA POSTPROCEDURE EVALUATION
Department of Anesthesiology  Postprocedure Note    Patient: Janeth Watts  MRN: [id number]  YOB: 1950  Date of evaluation: 4/25/2025    Procedure Summary       Date: 04/25/25 Room / Location: [facility] ENDO 04 / [facility] END[facility]COPY    Anesthesia Start: 1146 Anesthesia Stop: 1259    Procedures:       ESOPHAGOGASTRODUODENOSCOPY BIOPSY (Upper GI Region)      COLONOSCOPY POLYPECTOMY SNARE/BIOPSY (Lower GI Region) Diagnosis:       Weight loss      Change in bowel habits      Chronic diarrhea      Colon polyps      Mucosal abnormality of colon      Diverticulosis      (Weight loss [R63.4])      (Colon cancer screening [Z12.11])      (Change in bowel habits [R19.4])    Surgeons: Nixon Vines MD Responsible Provider: Luis Dick MD    Anesthesia Type: MAC ASA Status: 2            Anesthesia Type: MAC    Delonte Phase I: Delonte Score: 10    Delonte Phase II: Delonte Score: 10    Anesthesia Post Evaluation    Patient location during evaluation: PACU  Patient participation: complete - patient participated  Level of consciousness: awake and alert  Airway patency: patent  Nausea & Vomiting: no nausea  Cardiovascular status: hemodynamically stable  Respiratory status: acceptable  Hydration status: euvolemic    No notable events documented.

## (undated) DEVICE — ELECTRODE PT RET AD L9FT HI MOIST COND ADH HYDRGEL CORDED

## (undated) DEVICE — TIP SUCT TRNSPAR RIB SURF STD BLB RIG NVENT W/ 5IN1 CONN DYND50138] MEDLINE INDUSTRIES INC]

## (undated) DEVICE — CONTAINER SPEC 20 ML LID NEUT BUFF FORMALIN 10 % POLYPR STS

## (undated) DEVICE — SET GRAV CK VLV NEEDLESS ST 3 GANGED 4WAY STPCOCK HI FLO 10

## (undated) DEVICE — TRAP SPEC POLYP REM STRNR CLN DSGN MAGNIFYING WIND DISP

## (undated) DEVICE — FIAPC® PROBE W/ FILTER 2200 C OD 2.3MM/6.9FR; L 2.2M/7.2FT: Brand: ERBE

## (undated) DEVICE — INJECTION THERAPY NEEDLE CATHETER: Brand: INTERJECT

## (undated) DEVICE — SNARE ENDOSCP POLYP MED STD AD 2.4X27X240 CM 2.8 MM OVL SENS

## (undated) DEVICE — CATHETER IV 24GA L0.75IN OD0.6604-0.7366MM

## (undated) DEVICE — SNARE ENDOSCP POLYP 2.4 MM 240 CM 10 MM 2.8 MM CAPTIVATOR

## (undated) DEVICE — FORCEPS BX L240CM JAW DIA2.4MM ORNG L CAP W/ NDL DISP RAD

## (undated) DEVICE — FIAPC® PROBE W/ FILTER 2200 A OD 2.3MM/6.9FR; L 2.2M/7.2FT: Brand: ERBE

## (undated) DEVICE — CATHETER IV 22GA L1IN OD0.8382-0.9144MM ID0.6096-0.6858MM 382523

## (undated) DEVICE — TRAP ENDOSCP POLYP 2 CHMBR DRAWER TYP

## (undated) DEVICE — CATHETER IV 18GA L1.16IN OD1.27-1.3462MM ID0.9398-1.016MM

## (undated) DEVICE — FORCEPS BX 240CM 2.4MM L NDL RAD JAW 4 M00513334

## (undated) DEVICE — CUFF BLD PRSS AD CLTH SGL TB W/ BAYNT CONN ROUNDED CORNER

## (undated) DEVICE — COVIDIEN KENDALL DL DISPOSABLE 3 LEAD SY: Brand: MEDLINE RENEWAL

## (undated) DEVICE — CATHETER IV 20GA L1.16IN OD1.0414-1.1176MM ID0.762-0.8382MM

## (undated) DEVICE — ENDOSCOPIC KIT COMPLIANCE ENDOKIT

## (undated) DEVICE — IV START KIT: Brand: MEDLINE